# Patient Record
Sex: MALE | Race: WHITE
[De-identification: names, ages, dates, MRNs, and addresses within clinical notes are randomized per-mention and may not be internally consistent; named-entity substitution may affect disease eponyms.]

---

## 2018-12-29 ENCOUNTER — HOSPITAL ENCOUNTER (EMERGENCY)
Dept: HOSPITAL 31 - C.ER | Age: 63
Discharge: HOME | End: 2018-12-29
Payer: MEDICAID

## 2018-12-29 VITALS
SYSTOLIC BLOOD PRESSURE: 129 MMHG | HEART RATE: 92 BPM | RESPIRATION RATE: 18 BRPM | OXYGEN SATURATION: 98 % | TEMPERATURE: 98.3 F | DIASTOLIC BLOOD PRESSURE: 82 MMHG

## 2018-12-29 DIAGNOSIS — M54.5: Primary | ICD-10-CM

## 2018-12-29 NOTE — RAD
Date of service: 



12/29/2018



PROCEDURE:  Radiographs of the Lumbar Spine.



HISTORY:

back pain







COMPARISON:

No prior.



FINDINGS:



BONES:

There is diffuse bone demineralization and multilevel degenerative 

changes in the spine.



There is normal alignment of the lumbar vertebral bodies.  There is 

normal lumbar lordosis. There is no acute fracture, spondylolysis or 

spondylolisthesis.  Bone mineralization is normal.



DISC SPACES:

The disc heights are maintained.



OTHER FINDINGS:

No pathologic soft tissue calcifications.  There is a focal area of 

sclerosis along the inferior left sacroiliac joint



IMPRESSION:

No acute fracture, spondylolysis or spondylolisthesis.



Focal area of sclerosis along the inferior left sacroiliac joint is 

nonspecific and could represent a large bone island however sclerotic 

metastasis is also a considerations.  Correlation with known or 

suspected history of cancer is advised and if clinically indicated 

radionuclide scan may be performed for definitive evaluation.

## 2018-12-29 NOTE — C.PDOC
History Of Present Illness





63 year old male with no prior medical problems presents to the ED for 

evaluation of sudden onset of lower back pain s/p bending over to  an 

object 2 days ago. The patient reports bending over to  an object and 

suddenly feeling pain to his lower back. He admits to taking Motrin with no 

relief in symptoms. Denies weakness, numbness, tingling, incontinence to urine 

or stool, saddle anesthesia, and any other associated symptoms. 





Time Seen by Provider: 12/29/18 11:55


Chief Complaint (Nursing): Back Pain


History Per: Patient


History/Exam Limitations: no limitations


Onset/Duration Of Symptoms: Days


Current Symptoms Are (Timing): Still Present





Past Medical History


Reviewed: Historical Data, Nursing Documentation, Vital Signs


Vital Signs: 





                                Last Vital Signs











Temp  98.3 F   12/29/18 11:30


 


Pulse  92 H  12/29/18 11:30


 


Resp  18   12/29/18 11:30


 


BP  129/82   12/29/18 11:30


 


Pulse Ox  98   12/29/18 11:30











Family History: States: Unknown Family Hx





- Social History


Hx Tobacco Use: No


Hx Alcohol Use: No


Hx Substance Use: No





- Immunization History


Hx Tetanus Toxoid Vaccination: No


Hx Influenza Vaccination: No


Hx Pneumococcal Vaccination: No





Review Of Systems


Except As Marked, All Systems Reviewed And Found Negative.


Musculoskeletal: Positive for: Other (lower back pain. )





Physical Exam





- Physical Exam


Appears: Non-toxic, No Acute Distress


Skin: Normal Color, Warm, Dry


Head: Atraumatic, Normacephalic


Eye(s): bilateral: Normal Inspection, PERRL, EOMI


Nose: Normal


Oral Mucosa: Moist


Neck: Supple


Chest: Symmetrical


Cardiovascular: Rhythm Regular, No Murmur


Respiratory: Normal Breath Sounds, No Rales, No Rhonchi, No Wheezing


Back: No Vertebral Tenderness, Paraspinal Tenderness


Extremity: Bilateral: Atraumatic, Normal Color And Temperature, Normal ROM, 

Other ((-) straight leg test. )


Neurological/Psych: Oriented x3, Normal Speech, Normal Motor, Normal Sensation, 

Normal Reflexes





ED Course And Treatment


O2 Sat by Pulse Oximetry: 98 (RA)


Pulse Ox Interpretation: Normal





- Other Rad


  ** LS x-ray 


X-Ray: Interpreted by Me, Viewed By Me


Interpretation: preliminary reading: no fracture, no dislocation, no active 

disease.





Medical Decision Making


Medical Decision Making: 





Assessment: lower back pain 





Plan: 


-Flexeril 


-Lidoderm 


-Motrin 


-Tylenol/Codeine 


-LS AP/LAT x-ray 





Progress/Update: 


X-ray: preliminary reading no fracture, no dislocation, no active disease.





reviewed radiology reading and no report of cancer in patient history. 





Patient stable for discharge home. 





Prescribed Naproxen, Lidoderm, and Cyclobenzaprine HCl








Disposition


Counseled Patient/Family Regarding: Studies Performed, Diagnosis, Need For 

Followup, Rx Given





- Disposition


Referrals: 


[facility] [Outside]


Disposition: HOME/ ROUTINE


Disposition Time: 12:21


Condition: STABLE


Additional Instructions: 


follow up with medical clinic within 2 days


call to make an appointment


take medications as needed for pain


no heavy lifting 


return to ER if symptoms worsens or progress 


Prescriptions: 


Cyclobenzaprine [Cyclobenzaprine HCl] 10 mg PO TID PRN #12 tab


 PRN Reason: Muscle Spasm


Lidocaine 5% [Lidoderm] 1 ea TD DAILY PRN #10 patch


 PRN Reason: Pain, Moderate (4-7)


Naproxen [Naprosyn] 500 mg PO BID PRN #16 tab


 PRN Reason: Pain, Moderate (4-7)


Instructions:  Low Back Pain in Adults


Forms:  Gen Discharge Inst [place], [facility] ([place]), Work Excuse


Print Language: Dominican





- Clinical Impression


Clinical Impression: 


 Low back pain








- Scribe Statement


The provider has reviewed the documentation as recorded by the Scribe (Latonia Crawford)


Provider Attestation: 





All medical record entries made by the Scribe were at my direction and 

personally dictated by me. I have reviewed the chart and agree that the record 

accurately reflects my personal performance of the history, physical exam, 

medical decision making, and the department course for this patient. I have also

 personally directed, reviewed, and agree with the discharge instructions and 

disposition.

## 2019-03-01 ENCOUNTER — HOSPITAL ENCOUNTER (INPATIENT)
Dept: HOSPITAL 31 - C.ER | Age: 64
LOS: 2 days | Discharge: HOME | DRG: 247 | End: 2019-03-03
Attending: FAMILY MEDICINE | Admitting: FAMILY MEDICINE
Payer: SELF-PAY

## 2019-03-01 VITALS — BODY MASS INDEX: 25.8 KG/M2

## 2019-03-01 DIAGNOSIS — F17.210: ICD-10-CM

## 2019-03-01 DIAGNOSIS — R74.0: ICD-10-CM

## 2019-03-01 DIAGNOSIS — I25.10: ICD-10-CM

## 2019-03-01 DIAGNOSIS — Z83.3: ICD-10-CM

## 2019-03-01 DIAGNOSIS — I21.19: Primary | ICD-10-CM

## 2019-03-01 DIAGNOSIS — Z79.899: ICD-10-CM

## 2019-03-01 DIAGNOSIS — Z77.22: ICD-10-CM

## 2019-03-01 LAB
ALBUMIN SERPL-MCNC: 4.8 G/DL (ref 3.5–5)
ALBUMIN/GLOB SERPL: 1.4 {RATIO} (ref 1–2.1)
ALT SERPL-CCNC: 71 U/L (ref 21–72)
APTT BLD: 56 SECONDS (ref 21–34)
AST SERPL-CCNC: 98 U/L (ref 17–59)
BASOPHILS # BLD AUTO: 0 K/UL (ref 0–0.2)
BASOPHILS NFR BLD: 0.3 % (ref 0–2)
BUN SERPL-MCNC: 19 MG/DL (ref 9–20)
CALCIUM SERPL-MCNC: 9.1 MG/DL (ref 8.6–10.4)
CK MB SERPL-MCNC: 346 NG/ML (ref 0–3.38)
CK MB SERPL-MCNC: 394 NG/ML (ref 0–3.38)
EOSINOPHIL # BLD AUTO: 0 K/UL (ref 0–0.7)
EOSINOPHIL NFR BLD: 0.1 % (ref 0–4)
ERYTHROCYTE [DISTWIDTH] IN BLOOD BY AUTOMATED COUNT: 13.8 % (ref 11.5–14.5)
GFR NON-AFRICAN AMERICAN: > 60
HGB BLD-MCNC: 16.2 G/DL (ref 12–18)
INR PPP: 1
LYMPHOCYTE: 4 % (ref 20–40)
LYMPHOCYTES # BLD AUTO: 1.2 K/UL (ref 1–4.3)
LYMPHOCYTES NFR BLD AUTO: 9.9 % (ref 20–40)
MCH RBC QN AUTO: 33.6 PG (ref 27–31)
MCHC RBC AUTO-ENTMCNC: 33.1 G/DL (ref 33–37)
MCV RBC AUTO: 101.7 FL (ref 80–94)
MONOCYTE: 3 % (ref 0–10)
MONOCYTES # BLD: 0.3 K/UL (ref 0–0.8)
MONOCYTES NFR BLD: 2.8 % (ref 0–10)
NEUTROPHILS # BLD: 10.7 K/UL (ref 1.8–7)
NEUTROPHILS NFR BLD AUTO: 86.9 % (ref 50–75)
NEUTROPHILS NFR BLD AUTO: 93 % (ref 50–75)
NRBC BLD AUTO-RTO: 0.1 % (ref 0–2)
PLATELET # BLD EST: NORMAL 10*3/UL
PLATELET # BLD: 294 K/UL (ref 130–400)
PMV BLD AUTO: 8.9 FL (ref 7.2–11.7)
PROTHROMBIN TIME: 11.2 SECONDS (ref 9.7–12.2)
RBC # BLD AUTO: 4.83 MIL/UL (ref 4.4–5.9)
RBC MORPH BLD: NORMAL
TOTAL CELLS COUNTED BLD: 100
TROPONIN I SERPL-MCNC: 232 NG/ML (ref 0–0.12)
TROPONIN I SERPL-MCNC: 346 NG/ML (ref 0–0.12)
WBC # BLD AUTO: 12.3 K/UL (ref 4.8–10.8)

## 2019-03-01 PROCEDURE — B2111ZZ FLUOROSCOPY OF MULTIPLE CORONARY ARTERIES USING LOW OSMOLAR CONTRAST: ICD-10-PCS | Performed by: INTERNAL MEDICINE

## 2019-03-01 PROCEDURE — B2151ZZ FLUOROSCOPY OF LEFT HEART USING LOW OSMOLAR CONTRAST: ICD-10-PCS | Performed by: INTERNAL MEDICINE

## 2019-03-01 PROCEDURE — 4A023N7 MEASUREMENT OF CARDIAC SAMPLING AND PRESSURE, LEFT HEART, PERCUTANEOUS APPROACH: ICD-10-PCS | Performed by: INTERNAL MEDICINE

## 2019-03-01 PROCEDURE — 027034Z DILATION OF CORONARY ARTERY, ONE ARTERY WITH DRUG-ELUTING INTRALUMINAL DEVICE, PERCUTANEOUS APPROACH: ICD-10-PCS | Performed by: INTERNAL MEDICINE

## 2019-03-01 RX ADMIN — EPTIFIBATIDE SCH MLS/HR: 0.75 INJECTION, SOLUTION INTRAVENOUS at 13:42

## 2019-03-01 RX ADMIN — EPTIFIBATIDE SCH MLS/HR: 0.75 INJECTION, SOLUTION INTRAVENOUS at 20:00

## 2019-03-01 NOTE — CP.PCM.PN
<Rajeev Bellamy - Last Filed: 03/01/19 11:58>





Subjective





- Date & Time of Evaluation


Date of Evaluation: 03/01/19


Time of Evaluation: 11:00





- Subjective


Subjective: 


House Doctor note for CODE HEART 





Patient is 64 year old male with no pmhx, brought from field by EMS complaining 

of chest tightness and nonradiating chest pain 6/8 upon awakening, chills and 

nausea, started this morning at 7 am, patient did not lose consciousness,  EKG 

in field remarkable for inferior and anterior lateral ST elevation changes. 

Received ASA in the field. As per step daughter, patient does not take 

medication at home, no allergies, smokes cigarettes, does not know the amount. 

Upon Ed arrival, patient is alert and oriented, complaining of chest pain, and 

chills, Patient received heparin bolus and low dose Brilinta, transferred to 

cath lab for cath with Dr Horton. Patient and family explained of the procedure. 

consent signed by patient for cath and anesthesia, in chart.  








Objective





- Vital Signs/Intake and Output


Vital Signs (last 24 hours): 


                                        











Temp Pulse Resp BP Pulse Ox


 


    62   18   105/85   100 


 


    03/01/19 11:48  03/01/19 11:48  03/01/19 11:48  03/01/19 11:53











- Labs


Labs: 


                                        





                                 03/01/19 11:44 





                                        











PT  11.2 SECONDS (9.7-12.2)   03/01/19  11:44    


 


INR  1.0   03/01/19  11:44    


 


APTT  56 SECONDS (21-34)  H  03/01/19  11:44    














- Constitutional


Appears: Non-toxic





- Head Exam


Head Exam: ATRAUMATIC, NORMAL INSPECTION, NORMOCEPHALIC





- Eye Exam


Eye Exam: EOMI, Normal appearance





- ENT Exam


ENT Exam: Mucous Membranes Moist





- Neck Exam


Neck Exam: Normal Inspection





- Respiratory Exam


Respiratory Exam: absent: Rales, Rhonchi, Wheezes





- Cardiovascular Exam


Cardiovascular Exam: REGULAR RHYTHM, +S1, +S2





- GI/Abdominal Exam


GI & Abdominal Exam: Soft





- Neurological Exam


Neurological Exam: Alert, Awake, Oriented x3





- Psychiatric Exam


Psychiatric exam: Anxious





- Skin


Skin Exam: Dry, Intact, Normal Color, Warm





<Anat Houser - Last Filed: 03/01/19 20:52>





Objective





- Vital Signs/Intake and Output


Vital Signs (last 24 hours): 


                                        











Temp Pulse Resp BP Pulse Ox


 


 98.1 F   61   27 H  151/99 H  100 


 


 03/01/19 16:00  03/01/19 20:01  03/01/19 20:01  03/01/19 20:01  03/01/19 20:01








Intake and Output: 


                                        











 03/01/19 03/02/19





 18:59 06:59


 


Intake Total 381.9 61.7


 


Output Total 375 


 


Balance 6.9 61.7














- Medications


Medications: 


                               Current Medications





Acetaminophen (Tylenol 325mg Tab)  650 mg PO Q6 PRN


   PRN Reason: Pain, moderate (4-7)


Aspirin (Ecotrin)  81 mg PO DAILY [facility]


Dextrose (Dextrose 50% Inj)  0 ml IV STAT PRN; Protocol


   PRN Reason: Hypoglycemia Protocol


Dextrose (Glutose 15)  0 gm PO ONCE PRN; Protocol


   PRN Reason: Hypoglycemia Protocol


Enalapril Maleate (Vasotec)  2.5 mg PO DAILY [facility]


Glucagon (Glucagen Diagnostic Kit)  0 mg IM STAT PRN; Protocol


   PRN Reason: Hypoglycemia Protocol


Eptifibatide (Integrilin)  75 mg in 100 mls @ 11.612 mls/hr IV .Q8H37M [facility]


   Stop: 03/02/19 00:46


   Last Admin: 03/01/19 13:42 Dose:  11.612 mls/hr


Sodium Chloride (Sodium Chloride 0.9%)  500 mls @ 50 mls/hr IV .Q10H [facility]


   Last Admin: 03/01/19 13:45 Dose:  50 mls/hr


Dextrose (Dextrose 5% In Water 1000 Ml)  1,000 mls @ 0 mls/hr IV .Q0M PRN; 

Protocol


   PRN Reason: Hypoglycemia Protocol


Rosuvastatin Calcium (Crestor)  20 mg PO HS [facility]


Ticagrelor (Brilinta)  90 mg PO BID [facility]


   Last Admin: 03/01/19 17:23 Dose:  90 mg











- Labs


Labs: 


                                        





                                 03/01/19 11:44 





                                 03/01/19 11:44 





                                        











PT  11.2 SECONDS (9.7-12.2)   03/01/19  11:44    


 


INR  1.0   03/01/19  11:44    


 


APTT  56 SECONDS (21-34)  H  03/01/19  11:44    














Attending/Attestation





- Attestation


I have personally seen and examined this patient.: Yes


I have fully participated in the care of the patient.: Yes


I have reviewed all pertinent clinical information, including history, physical 

exam and plan: Yes


Notes (Text): 


Brief hospitalist note


Read: code heart


Patient seen at [facility].  Responded to code heart as part of code heart 

protocol.  Patient is a 64-year-old male noted to have chest pain early this 

morning at approximately 730 per EMS as per as per as well as a stepdaughter at 

bedside.  Patient given aspirin 325 en route per EMS.  Per code heart protocol 

and given loading dose of Brilinta and heparin patient accompanied to Cath Lab 

no acute events noted with the assistance of translation with my resident Dr. Bellamy in Faroese we have described the cardiac cath risks involved as well as 

sedation and risks involved in regards to patient's having had a heart attack 

requiring a cardiac catheter immediately.  I also spoke with patient's 

stepdaughter who is present at bedside as well.  Interventional cardiology 

arrived on scene.  Per management per interventional cardiology

## 2019-03-01 NOTE — RAD
Date of service: 



03/01/2019



HISTORY:

Chest pain



COMPARISON:

No prior. 



FINDINGS:



LUNGS:

The lungs are well inflated.  There is mild pulmonary venous 

congestion.  No focal consolidation. 



PLEURA:

No pleural effusions or pneumothorax. 



CARDIOVASCULAR:

The heart is normal in size.  There is unfolding of the aorta.  No 

aortic atherosclerotic calcifications present. 



OSSEOUS STRUCTURES:

Within normal limits for the patient's age.



VISUALIZED UPPER ABDOMEN:

Normal.



OTHER FINDINGS:

None.



IMPRESSION:

No active pulmonary disease.

## 2019-03-01 NOTE — CP.PCM.HP
<RituBobby - Last Filed: 19 15:51>





History of Present Illness





- History of Present Illness


History of Present Illness: 











This is a 64 year old male, originally from [place], with past medical hx of

personal tobacco abuse, also significant secondhand smoke exposure, presenting 

today with chief complaint of chest pain. Pt brought in by EMS. Patient is 

[place]oz[place] speaking only. Hx obtained through step daughter. Pt says that as 

patient was getting ready for work this morning around 7:30 am, began feeling 

some chest tightness and pressure and overall not feeling right. Reports this 

has never happened before. Reports pain was diffuse across chest with no 

radiation to jaw or arm. Pain was constant with severity of 9/10. Patient did 

not take any medications at home to alleviate pain. Initially patient was going 

to ride it out but began shaking and feeling pale, decision was made to call 

ambulance. 























PMD: None; reports he has not seen a doctor in an outpatient setting in many 

years.





PMH: Tobacco abuse, secondhand smoke exposure (wife)








PSH: Denies











Allergies: NKDA











FH: Mother- 


Father-





Reports family hx of DM. Otherwise denies hx of heart dx, cancer, etc.











Current medications: None














Social hx: Current smoker. Cutting down now but but pack per day for over 20 

yrs. Social drinker. No drug use. Born in [place]. 7 children. Lives with wife and 2 

stepdaughters. . 











Prior visits: Recent ER visit 2018 for lower back pain. No prior admissions. 








Code status: Stepdaughter reports this has not been discussed yet.








Advance directive: None








Healthcare proxy:  None. 





Present on Admission





- Present on Admission


Any Indicators Present on Admission: No


History of DVT/PE: No


History of Uncontrolled Diabetes: No


Urinary Catheter: No


Decubitus Ulcer Present: No





Review of Systems





- Constitutional


Constitutional: absent: Anorexia, Fever





- EENT


Eyes: absent: Blurred Vision, Change in Vision


Ears: absent: Decreased Hearing, Tinnitus


Nose/Mouth/Throat: absent: Nasal Congestion, Nasal Discharge





- Cardiovascular


Cardiovascular: Chest Pain, Chest Pain at Rest, Dyspnea





- Respiratory


Respiratory: Dyspnea.  absent: Cough





- Gastrointestinal


Gastrointestinal: absent: Abdominal Pain, Bloating





- Genitourinary


Genitourinary: absent: Change in Urinary Stream, Difficulty Urinating





- Musculoskeletal


Musculoskeletal: absent: Abnormal Gait, Back Pain





- Integumentary


Integumentary: absent: Rash





- Neurological


Neurological: absent: Syncope





- Psychiatric


Psychiatric: absent: Hallucinations, Visual Hallucinations, Tactile 

Hallucinations





- Endocrine


Endocrine: absent: Polyphagia, Polyuria





- Hematologic/Lymphatic


Hematologic: absent: Easy Bleeding, Easy Bruising





Past Patient History





- Infectious Disease


Hx of Infectious Diseases: None





- Tetanus Immunizations


Tetanus Immunization: Unknown





- Past Medical History & Family History


Past Medical History?: Yes


Pertinent Family History: 





DM in family 





- Past Social History


Smoking Status: Heavy Smoker > 10 Cigarettes Daily


Chewing Tobacco Use: No


Cigar Use: No


Alcohol: Social


Drugs: Denies


Home Situation {Lives}: With Family


Domestic Violence: Negative





- PSYCHIATRIC


Hx Substance Use: No





- SURGICAL HISTORY


Hx Surgeries: No





- ANESTHESIA


Hx Anesthesia: No





Meds


Allergies/Adverse Reactions: 


                                    Allergies











Allergy/AdvReac Type Severity Reaction Status Date / Time


 


No Known Allergies Allergy   Verified 19 11:37














Physical Exam





- Constitutional


Appears: Non-toxic, No Acute Distress





- Head Exam


Head Exam: ATRAUMATIC, NORMAL INSPECTION, NORMOCEPHALIC





- Eye Exam


Eye Exam: EOMI





- ENT Exam


ENT Exam: Mucous Membranes Moist





- Neck Exam


Neck exam: Positive for: Full Rom, Normal Inspection





- Respiratory Exam


Respiratory Exam: NORMAL BREATHING PATTERN.  absent: Respiratory Distress





- Cardiovascular Exam


Cardiovascular Exam: REGULAR RHYTHM, +S1, +S2





- GI/Abdominal Exam


GI & Abdominal Exam: Normal Bowel Sounds, Soft.  absent: Tenderness





- Extremities Exam


Extremities exam: Positive for: full ROM, normal inspection





- Neurological Exam


Neurological exam: Alert, CN II-XII Intact, Oriented x3





- Psychiatric Exam


Psychiatric exam: Flat Affect





- Skin


Skin Exam: Dry, Intact, Normal Color, Warm


Additional comments: 





dressing right groin clean, dry, intact





Results





- Vital Signs


Recent Vital Signs: 





                                Last Vital Signs











Temp      


 


Pulse  62   19 11:48


 


Resp  18   19 11:48


 


BP  105/85   19 11:48


 


Pulse Ox  100   19 11:53














- Labs


Result Diagrams: 


                                 19 11:44





                                 19 11:44


Labs: 





                         Laboratory Results - last 24 hr











  19





  11:44 11:44 11:44


 


WBC  12.3 H  


 


RBC  4.83  


 


Hgb  16.2  


 


Hct  49.1  


 


MCV  101.7 H  


 


MCH  33.6 H  


 


MCHC  33.1  


 


RDW  13.8  


 


Plt Count  294  


 


MPV  8.9  


 


Neut % (Auto)  86.9 H  


 


Lymph % (Auto)  9.9 L  


 


Mono % (Auto)  2.8  


 


Eos % (Auto)  0.1  


 


Baso % (Auto)  0.3  


 


Neut # (Auto)  10.7 H  


 


Lymph # (Auto)  1.2  


 


Mono # (Auto)  0.3  


 


Eos # (Auto)  0.0  


 


Baso # (Auto)  0.0  


 


Neutrophils % (Manual)  93 H  


 


Lymphocytes % (Manual)  4 L  


 


Monocytes % (Manual)  3  


 


Platelet Estimate  Normal  


 


RBC Morphology  Normal  


 


PT   11.2 


 


INR   1.0 


 


APTT   56 H 


 


Sodium    140


 


Potassium    4.6


 


Chloride    102


 


Carbon Dioxide    25


 


Anion Gap    18


 


BUN    19


 


Creatinine    0.9


 


Est GFR ( Amer)    > 60


 


Est GFR (Non-Af Amer)    > 60


 


Random Glucose    210 H


 


Calcium    9.1


 


Total Bilirubin    0.5


 


AST    98 H


 


ALT    71


 


Alkaline Phosphatase    131 H


 


Lactate Dehydrogenase    645 H


 


Total Creatine Kinase    242 H


 


Troponin I    0.4660 H*


 


Total Protein    8.2


 


Albumin    4.8


 


Globulin    3.5


 


Albumin/Globulin Ratio    1.4


 


Blood Type   


 


Antibody Screen   














  19





  11:52


 


WBC 


 


RBC 


 


Hgb 


 


Hct 


 


MCV 


 


MCH 


 


MCHC 


 


RDW 


 


Plt Count 


 


MPV 


 


Neut % (Auto) 


 


Lymph % (Auto) 


 


Mono % (Auto) 


 


Eos % (Auto) 


 


Baso % (Auto) 


 


Neut # (Auto) 


 


Lymph # (Auto) 


 


Mono # (Auto) 


 


Eos # (Auto) 


 


Baso # (Auto) 


 


Neutrophils % (Manual) 


 


Lymphocytes % (Manual) 


 


Monocytes % (Manual) 


 


Platelet Estimate 


 


RBC Morphology 


 


PT 


 


INR 


 


APTT 


 


Sodium 


 


Potassium 


 


Chloride 


 


Carbon Dioxide 


 


Anion Gap 


 


BUN 


 


Creatinine 


 


Est GFR ( Amer) 


 


Est GFR (Non-Af Amer) 


 


Random Glucose 


 


Calcium 


 


Total Bilirubin 


 


AST 


 


ALT 


 


Alkaline Phosphatase 


 


Lactate Dehydrogenase 


 


Total Creatine Kinase 


 


Troponin I 


 


Total Protein 


 


Albumin 


 


Globulin 


 


Albumin/Globulin Ratio 


 


Blood Type  O POSITIVE


 


Antibody Screen  Negative














Assessment & Plan





- Assessment and Plan (Free Text)


Assessment: 

















This is a 65 yo male, originally from [place], with past medical hx of tobacco abuse,

presenting with























1. Acute ST segment elevation MI














-ekg consistent with acute infero-lateral MI


-pt brought in by EMS


-code heart activated


-code heart on call doctor notified. Dr. Horton.


-pt consented for cardiac cath and transported to cardiac cath lab


-stent placed in RCA, full report pending


-start enalapril 2.5 mg po daily 


-start eptifibatide drip


-start rosuvasatin 20 mg po hs


-start ticagrelor 90 mg po bid 


-start asa 81 mg po daily 


-IV NS


-lipid panel


-HGB a1C

















2. Hx of tobacco abuse











-encourage smoking cessation




















3. GI/DVT ppx








-no GI indicated


-on eptifibatide drip and ticagrelor














discussed with Dr. Houser. 





<nAat Houser - Last Filed: 19 21:09>





Results





- Vital Signs


Recent Vital Signs: 





                                Last Vital Signs











Temp  98.1 F   19 16:00


 


Pulse  61   19 20:01


 


Resp  27 H  19 20:01


 


BP  151/99 H  19 20:01


 


Pulse Ox  100   19 20:01














- Labs


Result Diagrams: 


                                 19 11:44





                                 19 11:44


Labs: 





                         Laboratory Results - last 24 hr











  19





  11:44 11:44 11:44


 


WBC  12.3 H  


 


RBC  4.83  


 


Hgb  16.2  


 


Hct  49.1  


 


MCV  101.7 H  


 


MCH  33.6 H  


 


MCHC  33.1  


 


RDW  13.8  


 


Plt Count  294  


 


MPV  8.9  


 


Neut % (Auto)  86.9 H  


 


Lymph % (Auto)  9.9 L  


 


Mono % (Auto)  2.8  


 


Eos % (Auto)  0.1  


 


Baso % (Auto)  0.3  


 


Neut # (Auto)  10.7 H  


 


Lymph # (Auto)  1.2  


 


Mono # (Auto)  0.3  


 


Eos # (Auto)  0.0  


 


Baso # (Auto)  0.0  


 


Neutrophils % (Manual)  93 H  


 


Lymphocytes % (Manual)  4 L  


 


Monocytes % (Manual)  3  


 


Platelet Estimate  Normal  


 


RBC Morphology  Normal  


 


PT   11.2 


 


INR   1.0 


 


APTT   56 H 


 


Sodium    140


 


Potassium    4.6


 


Chloride    102


 


Carbon Dioxide    25


 


Anion Gap    18


 


BUN    19


 


Creatinine    0.9


 


Est GFR ( Amer)    > 60


 


Est GFR (Non-Af Amer)    > 60


 


Random Glucose    210 H


 


Calcium    9.1


 


Total Bilirubin    0.5


 


AST    98 H


 


ALT    71


 


Alkaline Phosphatase    131 H


 


Lactate Dehydrogenase    645 H


 


Total Creatine Kinase    242 H


 


CK-MB (Mass)   


 


Troponin I    0.4660 H*


 


Total Protein    8.2


 


Albumin    4.8


 


Globulin    3.5


 


Albumin/Globulin Ratio    1.4


 


Blood Type   


 


Antibody Screen   














  19





  11:52 16:09


 


WBC  


 


RBC  


 


Hgb  


 


Hct  


 


MCV  


 


MCH  


 


MCHC  


 


RDW  


 


Plt Count  


 


MPV  


 


Neut % (Auto)  


 


Lymph % (Auto)  


 


Mono % (Auto)  


 


Eos % (Auto)  


 


Baso % (Auto)  


 


Neut # (Auto)  


 


Lymph # (Auto)  


 


Mono # (Auto)  


 


Eos # (Auto)  


 


Baso # (Auto)  


 


Neutrophils % (Manual)  


 


Lymphocytes % (Manual)  


 


Monocytes % (Manual)  


 


Platelet Estimate  


 


RBC Morphology  


 


PT  


 


INR  


 


APTT  


 


Sodium  


 


Potassium  


 


Chloride  


 


Carbon Dioxide  


 


Anion Gap  


 


BUN  


 


Creatinine  


 


Est GFR ( Amer)  


 


Est GFR (Non-Af Amer)  


 


Random Glucose  


 


Calcium  


 


Total Bilirubin  


 


AST  


 


ALT  


 


Alkaline Phosphatase  


 


Lactate Dehydrogenase  


 


Total Creatine Kinase   7771 H


 


CK-MB (Mass)   346 H


 


Troponin I   232.0000 H*


 


Total Protein  


 


Albumin  


 


Globulin  


 


Albumin/Globulin Ratio  


 


Blood Type  O POSITIVE 


 


Antibody Screen  Negative 














Attending/Attestation





- Attestation


I have personally seen and examined this patient.: Yes


I have fully participated in the care of the patient.: Yes


I have reviewed all pertinent clinical information: Yes


Notes (Text): 


Patient seen, examined, case discussed with medical resident.


Patient patient presented a code heart.  Patient seen and case discussed with 

both interventional cardiologist and intensive care.  Patient is a 64-year-old 

male with prominent tobacco history who came in for chest pain initially at 7:30

AM this morning did not get better prompted calling by family by EMS this is the

first time he has had chest pain he is not seen PMD in quite some time no recent

trauma was not feeling unwell days prior was going about his normal business 

awaiting above I am except for this event.  He does not take any medications per

family.  Patient was accompanied to Cath Lab.  I described risks and benefits of

Cath Lab procedure as well as sedation with the help of translation with my 

[place]-speaking resident Dr. Bellamy.  But despite nurse Ruben in the emergency

room.  Patient consented to both catheter and sedation if needed.  Patient 

stabilized from cath following suit management per interventional cardiology.  

Patient was transferred to the intensive care following Cath Lab procedure.  

Patient on EKG at time of code heart was anterior inferior lateral MI.  Noted 

for RCA involvement.





1.  Acute coronary syndrome


Assessment/plan


* Code heart on 2019.  


* Management per interventional cardiology.  


* Patient stabilized and taken to the intensive care following a Cath Lab 

  procedure


* Patient to be observed in the ICU


* Echo ordered lipid panel


* TSH, A1c ordered for tomorrow.  


* Cardiology on board.  


* Patient to be observed in the intensive care unit.  


* Patient is currently on integrilin drip 


* Aspirin 81mg PO daily


* Brilinta 90mg PO BID


* Crestor 20mg PO qHS


* d/c Lisinopril to Losartan 25mg Po daily (given increase cancer risk with 

  lisinopril)


* likely need beta blocker assess tomorrow





2.  Tobacco abuse


Assessment/plan


* Nicotine patch daily


* patient will need extensive counseling regards to tobacco abuse and associated

  adverse events including but not limited to premature aging cancer risk and 

  ultimately death.





3. Prophylactic measure


Assessment/plan


* Integrilin drip per cardio.


* Pepcid 20 mg p.o. once a day


* IV fluids

## 2019-03-01 NOTE — CP.PCM.CON
History of Present Illness





- History of Present Illness


History of Present Illness: 





Patient with Acute Infero-lateral Myocardial infarction.





Past Patient History





- Infectious Disease


Hx of Infectious Diseases: None





- Past Social History


Smoking Status: Heavy Smoker > 10 Cigarettes Daily





- PSYCHIATRIC


Hx Substance Use: No





- SURGICAL HISTORY


Hx Surgeries: No





- ANESTHESIA


Hx Anesthesia: No





Meds


Home Medications: 


                              Home Medication List











 Medication  Instructions  Recorded  Confirmed  Type


 


Aspirin [Ecotrin] 81 mg PO DAILY #30 tabec 03/03/19  Rx


 


Clopidogrel Bisulfate [Plavix] 75 mg PO DAILY #30 tablet 03/03/19  Rx


 


Losartan [Cozaar] 25 mg PO DAILY #30 tab 03/03/19  Rx


 


Metoprolol Succinate XL [Toprol XL] 12.5 mg PO DAILY #30 tab 03/03/19  Rx


 


Rosuvastatin Calcium [Crestor] 20 mg PO HS #30 tab 03/03/19  Rx











Allergies/Adverse Reactions: 


                                    Allergies











Allergy/AdvReac Type Severity Reaction Status Date / Time


 


No Known Allergies Allergy   Verified 03/01/19 11:37














Results





- Vital Signs


Recent Vital Signs: 


                                Last Vital Signs











Temp      


 


Pulse  62   03/01/19 11:48


 


Resp  18   03/01/19 11:48


 


BP  105/85   03/01/19 11:48


 


Pulse Ox  100   03/01/19 11:53














- Labs


Result Diagrams: 


                                 03/03/19 09:05





                                 03/03/19 09:05


Labs: 


                         Laboratory Results - last 24 hr











  03/01/19 03/01/19 03/01/19





  11:44 11:44 11:44


 


WBC  12.3 H  


 


RBC  4.83  


 


Hgb  16.2  


 


Hct  49.1  


 


MCV  101.7 H  


 


MCH  33.6 H  


 


MCHC  33.1  


 


RDW  13.8  


 


Plt Count  294  


 


MPV  8.9  


 


Neut % (Auto)  86.9 H  


 


Lymph % (Auto)  9.9 L  


 


Mono % (Auto)  2.8  


 


Eos % (Auto)  0.1  


 


Baso % (Auto)  0.3  


 


Neut # (Auto)  10.7 H  


 


Lymph # (Auto)  1.2  


 


Mono # (Auto)  0.3  


 


Eos # (Auto)  0.0  


 


Baso # (Auto)  0.0  


 


Neutrophils % (Manual)  93 H  


 


Lymphocytes % (Manual)  4 L  


 


Monocytes % (Manual)  3  


 


Platelet Estimate  Normal  


 


RBC Morphology  Normal  


 


PT   11.2 


 


INR   1.0 


 


APTT   56 H 


 


Sodium    140


 


Potassium    4.6


 


Chloride    102


 


Carbon Dioxide    25


 


Anion Gap    18


 


BUN    19


 


Creatinine    0.9


 


Est GFR ( Amer)    > 60


 


Est GFR (Non-Af Amer)    > 60


 


Random Glucose    210 H


 


Calcium    9.1


 


Total Bilirubin    0.5


 


AST    98 H


 


ALT    71


 


Alkaline Phosphatase    131 H


 


Lactate Dehydrogenase    645 H


 


Total Creatine Kinase    242 H


 


Total Protein    8.2


 


Albumin    4.8


 


Globulin    3.5


 


Albumin/Globulin Ratio    1.4


 


Blood Type   


 


Antibody Screen   














  03/01/19





  11:52


 


WBC 


 


RBC 


 


Hgb 


 


Hct 


 


MCV 


 


MCH 


 


MCHC 


 


RDW 


 


Plt Count 


 


MPV 


 


Neut % (Auto) 


 


Lymph % (Auto) 


 


Mono % (Auto) 


 


Eos % (Auto) 


 


Baso % (Auto) 


 


Neut # (Auto) 


 


Lymph # (Auto) 


 


Mono # (Auto) 


 


Eos # (Auto) 


 


Baso # (Auto) 


 


Neutrophils % (Manual) 


 


Lymphocytes % (Manual) 


 


Monocytes % (Manual) 


 


Platelet Estimate 


 


RBC Morphology 


 


PT 


 


INR 


 


APTT 


 


Sodium 


 


Potassium 


 


Chloride 


 


Carbon Dioxide 


 


Anion Gap 


 


BUN 


 


Creatinine 


 


Est GFR ( Amer) 


 


Est GFR (Non-Af Amer) 


 


Random Glucose 


 


Calcium 


 


Total Bilirubin 


 


AST 


 


ALT 


 


Alkaline Phosphatase 


 


Lactate Dehydrogenase 


 


Total Creatine Kinase 


 


Total Protein 


 


Albumin 


 


Globulin 


 


Albumin/Globulin Ratio 


 


Blood Type  O POSITIVE


 


Antibody Screen  Negative














Assessment & Plan


(1) STEMI (ST elevation myocardial infarction)


Assessment and Plan: 


DAPT compliance and risk factor modification.


Status: Acute

## 2019-03-01 NOTE — C.PDOC
History Of Present Illness


66 y/o male brought in by EMS for Code Heart, initiated at 11:12 am prior to EMS

arrival. Cath lab activated. Dr. Horton notified. Patient received 325 mg Aspirin

en route.





On arrival patient reports having chest pain since 7:00am described as pressure-

like. Associated with SOB. He denies any nausea, vomiting, diaphoresis, abdomina

l pain, dizziness, headache, or other complaints. Patient admits to smoking. No 

known medical history. 





Time Seen by Provider: 03/01/19 11:35


Chief Complaint (Nursing): Chest Pain


History Per: Patient


History/Exam Limitations: no limitations


Onset/Duration Of Symptoms: Hrs


Current Symptoms Are (Timing): Still Present


Quality: Pressure


Associated Symptoms: Dyspnea


Modifying Factors: None


Additional History Per: EMS





Past Medical History


Reviewed: Historical Data, Nursing Documentation, Vital Signs


Vital Signs: 





                                Last Vital Signs











Temp      


 


Pulse  77   03/01/19 11:35


 


Resp  24   03/01/19 11:35


 


BP  172/118 H  03/01/19 11:35


 


Pulse Ox  100   03/01/19 11:35














- Medical History


PMH: No Chronic Diseases


Family History: States: Unknown Family Hx





- Social History


Hx Tobacco Use: Yes


Hx Alcohol Use: No


Hx Substance Use: No





- Immunization History


Hx Tetanus Toxoid Vaccination: No


Hx Influenza Vaccination: No


Hx Pneumococcal Vaccination: No





Review Of Systems


Except As Marked, All Systems Reviewed And Found Negative.


Constitutional: Negative for: Fever, Sweats


Eyes: Negative for: Vision Change


Cardiovascular: Positive for: Chest Pain


Respiratory: Positive for: Shortness of Breath.  Negative for: Cough


Gastrointestinal: Negative for: Nausea, Vomiting, Abdominal Pain


Musculoskeletal: Negative for: Back Pain


Neurological: Negative for: Headache, Dizziness





Physical Exam





- Physical Exam


Appears: Non-toxic, No Acute Distress


Skin: Warm, Dry, No Diaphoretic


Head: Atraumatic, Normacephalic


Eye(s): bilateral: Normal Inspection, PERRL, EOMI


Neck: Normal ROM


Chest: Symmetrical, No Deformity, No Tenderness


Cardiovascular: Rhythm Regular, No Murmur


Respiratory: Normal Breath Sounds, No Rales, No Rhonchi, No Wheezing


Gastrointestinal/Abdominal: Soft, No Tenderness, No Distention


Back: Normal Inspection


Extremity: Bilateral: Atraumatic, Normal Color And Temperature


Pulses: Left Radial: Normal, Right Radial: Normal


Neurological/Psych: Oriented x3





ED Course And Treatment





- Laboratory Results


Result Diagrams: 


                                 03/01/19 11:44





O2 Sat by Pulse Oximetry: 100 (RA)


Pulse Ox Interpretation: Normal





Medical Decision Making


Medical Decision Making: 





Impression: Acute MI


EKG: NSR with normal intervals, normal axis, with ST elevations in leads 2, 3, 

avF, v4-6





Administered Brilenta. 





Disposition


Discussed With Dr.: Anat Houser


Doctor Will See Patient In The: ED


Counseled Patient/Family Regarding: Studies Performed, Diagnosis





- Disposition


Disposition: HOSPITALIZED


Disposition Time: 11:53


Condition: FAIR


Forms:  [facility] (English)





- Clinical Impression


Clinical Impression: 


 STEMI (ST elevation myocardial infarction)








- Scribe Statement


The provider has reviewed the documentation as recorded by the Nenita Caputo





Provider Attestation: 


All medical record entries made by the Nancyibailyn were at my direction and 

personally dictated by me. I have reviewed the chart and agree that the record 

accurately reflects my personal performance of the history, physical exam, 

medical decision making, and the department course for this patient. I have also

personally directed, reviewed, and agree with the discharge instructions and 

disposition.

## 2019-03-01 NOTE — CP.PCM.CON
<Edward Moncada M - Last Filed: 03/01/19 14:29>





History of Present Illness





- History of Present Illness


History of Present Illness: 





Critical care consult Note for Dr. SIMONA Gonzalez





Consult for Post PCI w/ stent placement





64 M w/ PMHx of tobacco use disorder presented to the ED w/ chest pain. Per marjorie west, patient began having non radiating chest pain w/  diaphoresis and nausea 

this AM. Patient was lying in bed when symptoms arose. Patient took no 

medication at home for relief of symptoms. Patient was brought in by EMS found 

to have ST elevations in inferior leads, given aspirin and [facility] was 

called. Patient is post PCI treatment w/ stent in RCA.





At time of examination, patient offers no complaints. Denies headaches, vision 

changes, chest pain, SOB, abdominal pain, nausea, vomiting, diarrhea, 

constipation. 





PMhx: none


PSHx: none


Meds: none


Allergies: none


SHx: extensive tobacco use, drinks socially, denies illicit drug use 





Review of Systems





- Constitutional


Constitutional: absent: Anorexia, Chills, Weight Loss





- Cardiovascular


Cardiovascular: absent: Chest Pain, Chest Pain at Rest





- Respiratory


Respiratory: absent: Dyspnea, Wheezing





- Gastrointestinal


Gastrointestinal: absent: Abdominal Pain





- Genitourinary


Genitourinary: absent: Hematuria, Pyuria





- Musculoskeletal


Musculoskeletal: absent: Arthralgias, Atrophy





- Integumentary


Integumentary: absent: Pruritus, Rash





- Neurological


Neurological: absent: Headaches, Syncope





- Psychiatric


Psychiatric: absent: Confusion





Past Patient History





- Infectious Disease


Hx of Infectious Diseases: None





- Tetanus Immunizations


Tetanus Immunization: Unknown





- Past Medical History & Family History


Past Medical History?: Yes





- Past Social History


Smoking Status: Heavy Smoker > 10 Cigarettes Daily


Chewing Tobacco Use: No


Cigar Use: No





- PSYCHIATRIC


Hx Substance Use: No





- SURGICAL HISTORY


Hx Surgeries: No





- ANESTHESIA


Hx Anesthesia: No





Meds


Allergies/Adverse Reactions: 


                                    Allergies











Allergy/AdvReac Type Severity Reaction Status Date / Time


 


No Known Allergies Allergy   Verified 03/01/19 11:37














- Medications


Medications: 


                               Current Medications





Enalapril Maleate (Vasotec)  2.5 mg PO DAILY [facility]


Eptifibatide (Integrilin)  75 mg in 100 mls @ 11.612 mls/hr IV .Q8H37M [facility]


   Stop: 03/02/19 00:46


Sodium Chloride (Sodium Chloride 0.9%)  500 mls @ 50 mls/hr IV .Q10H [facility]


Rosuvastatin Calcium (Crestor)  20 mg PO HS [facility]


Ticagrelor (Brilinta)  90 mg PO BID [facility]











Physical Exam





- Constitutional


Appears: Non-toxic, No Acute Distress





- Head Exam


Head Exam: NORMAL INSPECTION





- Eye Exam


Eye Exam: EOMI, Normal appearance





- ENT Exam


ENT Exam: Mucous Membranes Moist





- Respiratory Exam


Respiratory Exam: Clear to Auscultation Bilateral, NORMAL BREATHING PATTERN.  

absent: Rales, Rhonchi, Wheezes





- Cardiovascular Exam


Cardiovascular Exam: +S1, +S2





- GI/Abdominal Exam


GI & Abdominal Exam: Normal Bowel Sounds, Soft





- Extremities Exam


Extremities exam: Positive for: full ROM, normal inspection.  Negative for: calf

tenderness, pedal edema





- Back Exam


Back exam: absent: CVA tenderness (L), CVA tenderness (R)





- Neurological Exam


Neurological exam: Alert, Oriented x3





- Psychiatric Exam


Psychiatric exam: Normal Affect, Normal Mood





- Skin


Skin Exam: Dry, Intact, Normal Color, Warm





Results





- Vital Signs


Recent Vital Signs: 


                                Last Vital Signs











Temp      


 


Pulse  62   03/01/19 11:48


 


Resp  18   03/01/19 11:48


 


BP  105/85   03/01/19 11:48


 


Pulse Ox  100   03/01/19 11:53














- Labs


Result Diagrams: 


                                 03/01/19 11:44





                                 03/01/19 11:44


Labs: 


                         Laboratory Results - last 24 hr











  03/01/19 03/01/19 03/01/19





  11:44 11:44 11:44


 


WBC  12.3 H  


 


RBC  4.83  


 


Hgb  16.2  


 


Hct  49.1  


 


MCV  101.7 H  


 


MCH  33.6 H  


 


MCHC  33.1  


 


RDW  13.8  


 


Plt Count  294  


 


MPV  8.9  


 


Neut % (Auto)  86.9 H  


 


Lymph % (Auto)  9.9 L  


 


Mono % (Auto)  2.8  


 


Eos % (Auto)  0.1  


 


Baso % (Auto)  0.3  


 


Neut # (Auto)  10.7 H  


 


Lymph # (Auto)  1.2  


 


Mono # (Auto)  0.3  


 


Eos # (Auto)  0.0  


 


Baso # (Auto)  0.0  


 


Neutrophils % (Manual)  93 H  


 


Lymphocytes % (Manual)  4 L  


 


Monocytes % (Manual)  3  


 


Platelet Estimate  Normal  


 


RBC Morphology  Normal  


 


PT   11.2 


 


INR   1.0 


 


APTT   56 H 


 


Sodium    140


 


Potassium    4.6


 


Chloride    102


 


Carbon Dioxide    25


 


Anion Gap    18


 


BUN    19


 


Creatinine    0.9


 


Est GFR ( Amer)    > 60


 


Est GFR (Non-Af Amer)    > 60


 


Random Glucose    210 H


 


Calcium    9.1


 


Total Bilirubin    0.5


 


AST    98 H


 


ALT    71


 


Alkaline Phosphatase    131 H


 


Lactate Dehydrogenase    645 H


 


Total Creatine Kinase    242 H


 


Troponin I    0.4660 H*


 


Total Protein    8.2


 


Albumin    4.8


 


Globulin    3.5


 


Albumin/Globulin Ratio    1.4


 


Blood Type   


 


Antibody Screen   














  03/01/19





  11:52


 


WBC 


 


RBC 


 


Hgb 


 


Hct 


 


MCV 


 


MCH 


 


MCHC 


 


RDW 


 


Plt Count 


 


MPV 


 


Neut % (Auto) 


 


Lymph % (Auto) 


 


Mono % (Auto) 


 


Eos % (Auto) 


 


Baso % (Auto) 


 


Neut # (Auto) 


 


Lymph # (Auto) 


 


Mono # (Auto) 


 


Eos # (Auto) 


 


Baso # (Auto) 


 


Neutrophils % (Manual) 


 


Lymphocytes % (Manual) 


 


Monocytes % (Manual) 


 


Platelet Estimate 


 


RBC Morphology 


 


PT 


 


INR 


 


APTT 


 


Sodium 


 


Potassium 


 


Chloride 


 


Carbon Dioxide 


 


Anion Gap 


 


BUN 


 


Creatinine 


 


Est GFR ( Amer) 


 


Est GFR (Non-Af Amer) 


 


Random Glucose 


 


Calcium 


 


Total Bilirubin 


 


AST 


 


ALT 


 


Alkaline Phosphatase 


 


Lactate Dehydrogenase 


 


Total Creatine Kinase 


 


Troponin I 


 


Total Protein 


 


Albumin 


 


Globulin 


 


Albumin/Globulin Ratio 


 


Blood Type  O POSITIVE


 


Antibody Screen  Negative














Assessment & Plan





- Assessment and Plan (Free Text)


Assessment: 





64M w/ no PMhx, s/p PCI w/ RCA stent


Plan: 





Neuro


- A&O x3





Cardio


- s/p PCI w/ drug eluding stent 


- loaded w/ aspirin/ brilinta


- on integrellin drip


- ASA/Brilinta 


- NS 50 cc/hr


- F/u HgA1c, lipid panel, TSH, Free T4


- F/u echo


- F/u EKG





Resp


- vital stable


- continue to monitor 





GI


- CLD for dinner





Renal


- Bun/Cr - WNL


- enalapril 2.5 mg PO daily resume 3/2





Heme


- H/H wnl


- elevated WBC


- likely reactive


- continue to monitor 





PPx


- GI: no indication


- DVT: on integrillin drip





<Lucrecia Gonzalez - Last Filed: 03/01/19 15:03>





Meds





- Medications


Medications: 


                               Current Medications





Aspirin (Ecotrin)  81 mg PO DAILY [facility]


Enalapril Maleate (Vasotec)  2.5 mg PO DAILY [facility]


Eptifibatide (Integrilin)  75 mg in 100 mls @ 11.612 mls/hr IV .Q8H37M [facility]


   Stop: 03/02/19 00:46


   Last Admin: 03/01/19 13:42 Dose:  11.612 mls/hr


Sodium Chloride (Sodium Chloride 0.9%)  500 mls @ 50 mls/hr IV .Q10H [facility]


   Last Admin: 03/01/19 13:45 Dose:  50 mls/hr


Rosuvastatin Calcium (Crestor)  20 mg PO HS [facility]


Ticagrelor (Brilinta)  90 mg PO BID [facility]











Results





- Vital Signs


Recent Vital Signs: 


                                Last Vital Signs











Temp  98 F   03/01/19 13:15


 


Pulse  56 L  03/01/19 13:45


 


Resp  18   03/01/19 13:45


 


BP  139/88   03/01/19 13:45


 


Pulse Ox  100   03/01/19 11:53














- Labs


Result Diagrams: 


                                 03/01/19 11:44





                                 03/01/19 11:44


Labs: 


                         Laboratory Results - last 24 hr











  03/01/19 03/01/19 03/01/19





  11:44 11:44 11:44


 


WBC  12.3 H  


 


RBC  4.83  


 


Hgb  16.2  


 


Hct  49.1  


 


MCV  101.7 H  


 


MCH  33.6 H  


 


MCHC  33.1  


 


RDW  13.8  


 


Plt Count  294  


 


MPV  8.9  


 


Neut % (Auto)  86.9 H  


 


Lymph % (Auto)  9.9 L  


 


Mono % (Auto)  2.8  


 


Eos % (Auto)  0.1  


 


Baso % (Auto)  0.3  


 


Neut # (Auto)  10.7 H  


 


Lymph # (Auto)  1.2  


 


Mono # (Auto)  0.3  


 


Eos # (Auto)  0.0  


 


Baso # (Auto)  0.0  


 


Neutrophils % (Manual)  93 H  


 


Lymphocytes % (Manual)  4 L  


 


Monocytes % (Manual)  3  


 


Platelet Estimate  Normal  


 


RBC Morphology  Normal  


 


PT   11.2 


 


INR   1.0 


 


APTT   56 H 


 


Sodium    140


 


Potassium    4.6


 


Chloride    102


 


Carbon Dioxide    25


 


Anion Gap    18


 


BUN    19


 


Creatinine    0.9


 


Est GFR ( Amer)    > 60


 


Est GFR (Non-Af Amer)    > 60


 


Random Glucose    210 H


 


Calcium    9.1


 


Total Bilirubin    0.5


 


AST    98 H


 


ALT    71


 


Alkaline Phosphatase    131 H


 


Lactate Dehydrogenase    645 H


 


Total Creatine Kinase    242 H


 


Troponin I    0.4660 H*


 


Total Protein    8.2


 


Albumin    4.8


 


Globulin    3.5


 


Albumin/Globulin Ratio    1.4


 


Blood Type   


 


Antibody Screen   














  03/01/19





  11:52


 


WBC 


 


RBC 


 


Hgb 


 


Hct 


 


MCV 


 


MCH 


 


MCHC 


 


RDW 


 


Plt Count 


 


MPV 


 


Neut % (Auto) 


 


Lymph % (Auto) 


 


Mono % (Auto) 


 


Eos % (Auto) 


 


Baso % (Auto) 


 


Neut # (Auto) 


 


Lymph # (Auto) 


 


Mono # (Auto) 


 


Eos # (Auto) 


 


Baso # (Auto) 


 


Neutrophils % (Manual) 


 


Lymphocytes % (Manual) 


 


Monocytes % (Manual) 


 


Platelet Estimate 


 


RBC Morphology 


 


PT 


 


INR 


 


APTT 


 


Sodium 


 


Potassium 


 


Chloride 


 


Carbon Dioxide 


 


Anion Gap 


 


BUN 


 


Creatinine 


 


Est GFR ( Amer) 


 


Est GFR (Non-Af Amer) 


 


Random Glucose 


 


Calcium 


 


Total Bilirubin 


 


AST 


 


ALT 


 


Alkaline Phosphatase 


 


Lactate Dehydrogenase 


 


Total Creatine Kinase 


 


Troponin I 


 


Total Protein 


 


Albumin 


 


Globulin 


 


Albumin/Globulin Ratio 


 


Blood Type  O POSITIVE


 


Antibody Screen  Negative














Assessment & Plan





- Assessment and Plan (Free Text)


Plan: 


Post PCI, Patient remains chest pain free


-continue oral medciations as per cardiology


-repeat EKG post PCI


-obtain echo


-continue to monitor








- Date & Time


Date: 03/01/19


Time: 15:03

## 2019-03-02 VITALS — OXYGEN SATURATION: 96 %

## 2019-03-02 VITALS — RESPIRATION RATE: 20 BRPM

## 2019-03-02 LAB
ALBUMIN SERPL-MCNC: 4 G/DL (ref 3.5–5)
ALBUMIN/GLOB SERPL: 1.3 {RATIO} (ref 1–2.1)
ALT SERPL-CCNC: 164 U/L (ref 21–72)
AST SERPL-CCNC: 716 U/L (ref 17–59)
BASOPHILS # BLD AUTO: 0 K/UL (ref 0–0.2)
BASOPHILS NFR BLD: 0.1 % (ref 0–2)
BUN SERPL-MCNC: 19 MG/DL (ref 9–20)
CALCIUM SERPL-MCNC: 8.7 MG/DL (ref 8.6–10.4)
CK MB SERPL-MCNC: 213 NG/ML (ref 0–3.38)
EOSINOPHIL # BLD AUTO: 0 K/UL (ref 0–0.7)
EOSINOPHIL NFR BLD: 0.1 % (ref 0–4)
ERYTHROCYTE [DISTWIDTH] IN BLOOD BY AUTOMATED COUNT: 13.8 % (ref 11.5–14.5)
GFR NON-AFRICAN AMERICAN: > 60
HDLC SERPL-MCNC: 38 MG/DL (ref 30–70)
HEPATITIS A IGM: NEGATIVE
HEPATITIS B CORE AB: NEGATIVE
HEPATITIS C ANTIBODY: NEGATIVE
HGB BLD-MCNC: 14.4 G/DL (ref 12–18)
LDLC SERPL-MCNC: 111 MG/DL (ref 0–129)
LYMPHOCYTES # BLD AUTO: 1.8 K/UL (ref 1–4.3)
LYMPHOCYTES NFR BLD AUTO: 12 % (ref 20–40)
MCH RBC QN AUTO: 33.8 PG (ref 27–31)
MCHC RBC AUTO-ENTMCNC: 33.3 G/DL (ref 33–37)
MCV RBC AUTO: 101.4 FL (ref 80–94)
MONOCYTES # BLD: 1.6 K/UL (ref 0–0.8)
MONOCYTES NFR BLD: 10.6 % (ref 0–10)
NEUTROPHILS # BLD: 11.8 K/UL (ref 1.8–7)
NEUTROPHILS NFR BLD AUTO: 77.2 % (ref 50–75)
NRBC BLD AUTO-RTO: 0 % (ref 0–2)
PLATELET # BLD: 243 K/UL (ref 130–400)
PMV BLD AUTO: 9.9 FL (ref 7.2–11.7)
RBC # BLD AUTO: 4.26 MIL/UL (ref 4.4–5.9)
TROPONIN I SERPL-MCNC: 164 NG/ML (ref 0–0.12)
WBC # BLD AUTO: 15.3 K/UL (ref 4.8–10.8)

## 2019-03-02 RX ADMIN — METOPROLOL SUCCINATE SCH MG: 25 TABLET, EXTENDED RELEASE ORAL at 11:00

## 2019-03-02 NOTE — CP.PCM.PN
Subjective





- Date & Time of Evaluation


Date of Evaluation: 03/02/19


Time of Evaluation: 06:55





- Subjective


Subjective: 


Medical attending note


Patient seen and examined.  Patient company with his wife figure at bedside.  

Patient denies any chest pain this morning.  Patient denies headache denies 

constipation denies abdominal pain denies nausea is urinating well.  Continue 

Gralise discontinued overnight.  Patient given losartan because of high blood 

pressure.  Did  patient extensively at bedside in regards to smoking is a

risk for heart disease.  Awaiting labs to discuss further cardiac risk.  

Discussed with ICU patient is likely to be downgraded later today.





Objective





- Vital Signs/Intake and Output


Vital Signs (last 24 hours): 


                                        











Temp Pulse Resp BP Pulse Ox


 


 98.1 F   61   20   134/91 H  100 


 


 03/02/19 00:00  03/02/19 06:02  03/02/19 06:02  03/02/19 06:02  03/02/19 06:02








Intake and Output: 


                                        











 03/02/19 03/02/19





 06:59 18:59


 


Intake Total 370.2 


 


Output Total 400 


 


Balance -29.8 














- Medications


Medications: 


                               Current Medications





Acetaminophen (Tylenol 325mg Tab)  650 mg PO Q6 PRN


   PRN Reason: Pain, moderate (4-7)


Aspirin (Ecotrin)  81 mg PO DAILY [facility]


Dextrose (Dextrose 50% Inj)  0 ml IV STAT PRN; Protocol


   PRN Reason: Hypoglycemia Protocol


Dextrose (Glutose 15)  0 gm PO ONCE PRN; Protocol


   PRN Reason: Hypoglycemia Protocol


Glucagon (Glucagen Diagnostic Kit)  0 mg IM STAT PRN; Protocol


   PRN Reason: Hypoglycemia Protocol


Dextrose (Dextrose 5% In Water 1000 Ml)  1,000 mls @ 0 mls/hr IV .Q0M PRN; 

Protocol


   PRN Reason: Hypoglycemia Protocol


Losartan Potassium (Cozaar)  25 mg PO DAILY [facility]


   Last Admin: 03/01/19 22:49 Dose:  25 mg


Nicotine (Nicoderm Cq)  1 patch TD DAILY [facility]


Rosuvastatin Calcium (Crestor)  20 mg PO HS [facility]


   Last Admin: 03/01/19 21:25 Dose:  20 mg


Ticagrelor (Brilinta)  90 mg PO BID [facility]


   Last Admin: 03/01/19 17:23 Dose:  90 mg











- Labs


Labs: 


                                        





                                 03/02/19 05:50 





                                 03/02/19 05:50 





                                        











PT  11.2 SECONDS (9.7-12.2)   03/01/19  11:44    


 


INR  1.0   03/01/19  11:44    


 


APTT  56 SECONDS (21-34)  H  03/01/19  11:44    














- Constitutional


Appears: Non-toxic, No Acute Distress





- Head Exam


Head Exam: NORMAL INSPECTION





- Eye Exam


Eye Exam: EOMI





- ENT Exam


ENT Exam: Mucous Membranes Moist





- Respiratory Exam


Respiratory Exam: Clear to Ausculation Bilateral, NORMAL BREATHING PATTERN.  

absent: Rales, Rhonchi, Wheezes





- Cardiovascular Exam


Cardiovascular Exam: REGULAR RHYTHM, +S1, +S2





- GI/Abdominal Exam


GI & Abdominal Exam: Soft, Normal Bowel Sounds.  absent: Distended, Guarding, 

Rigid, Tenderness, Rebound





- Extremities Exam


Extremities Exam: absent: Pedal Edema, Tenderness





- Neurological Exam


Neurological Exam: Alert, Awake, Oriented x3





- Psychiatric Exam


Psychiatric exam: Normal Affect, Normal Mood





- Skin


Skin Exam: Dry, Intact, Normal Color, Warm





Assessment and Plan


(1) STEMI (ST elevation myocardial infarction)


Assessment & Plan: 


Cardiology on consult help appreciated


Echocardiogram pending


Aspirin 81 mg 1 tab p.o. daily


Brilinta 90 mg once a day once twice a day will need to discuss with cardiology 

if appropriate to switch to Plavix given patient does not have insurance


Crestor on hold given transaminitis


Losartan 25 once a day


Start Toprol-XL 12.5 mg once a day


Lipid panel reviewed noted below 80 HDL


TSH is low but free T4 is normal


Pending official cardiac cath report however patient did receive a drug-eluting 

stent to the RCA.


A1c pending


Status: Acute   





(2) Tobacco abuse


Assessment & Plan: 


Patient counseled extensively at bedside in regards to smoking is a risk factor 

for heart disease he is aware he should stop smoking


Nicotine patch daily


Status: Acute   





(3) Transaminitis


Assessment & Plan: 


Patient had an acute rise in LFTs


Discontinue Tylenol and


Held Crestor


Hepatitis panel we will continue to trend LFTs unclear which relieved as an 

acute phase reactant or hyperperfusion delivered in light of a heart attack


Status: Acute   





(4) Prophylactic measure


Assessment & Plan: 


DVT prophylaxis heparin 5000 subcu 8


Heart healthy diet


Smoking cessation provided





Disposition:  discussed with ICU patient downgraded by ICU to telemetry.  

Pending official read of echocardiogram will need to monitor LFTs will need to 

follow-up with cardiology in regards to switch to Plavix.


Status: Acute

## 2019-03-02 NOTE — CP.PCM.PN
Subjective





- Date & Time of Evaluation


Date of Evaluation: 03/02/19


Time of Evaluation: 08:39





- Subjective


Subjective: 


Patient seen and examined at bedside. Patient deneis any chest pain, denies any 

dizziness.





Objective





- Vital Signs/Intake and Output


Vital Signs (last 24 hours): 


                                        











Temp Pulse Resp BP Pulse Ox


 


 98.1 F   61   20   134/91 H  100 


 


 03/02/19 00:00  03/02/19 06:02  03/02/19 06:02  03/02/19 06:02  03/02/19 06:02








Intake and Output: 


                                        











 03/02/19 03/02/19





 06:59 18:59


 


Intake Total 370.2 


 


Output Total 400 


 


Balance -29.8 














- Medications


Medications: 


                               Current Medications





Aspirin (Ecotrin)  81 mg PO DAILY [facility]


Dextrose (Dextrose 50% Inj)  0 ml IV STAT PRN; Protocol


   PRN Reason: Hypoglycemia Protocol


Dextrose (Glutose 15)  0 gm PO ONCE PRN; Protocol


   PRN Reason: Hypoglycemia Protocol


Glucagon (Glucagen Diagnostic Kit)  0 mg IM STAT PRN; Protocol


   PRN Reason: Hypoglycemia Protocol


Heparin Sodium (Porcine) (Heparin)  5,000 units SC Q8 [facility]


Dextrose (Dextrose 5% In Water 1000 Ml)  1,000 mls @ 0 mls/hr IV .Q0M PRN; 

Protocol


   PRN Reason: Hypoglycemia Protocol


Losartan Potassium (Cozaar)  25 mg PO DAILY [facility]


   Last Admin: 03/01/19 22:49 Dose:  25 mg


Metoprolol Succinate (Toprol Xl)  12.5 mg PO DAILY [facility]


Nicotine (Nicoderm Cq)  1 patch TD DAILY [facility]


Rosuvastatin Calcium (Crestor)  20 mg PO HS [facility]


   Last Admin: 03/01/19 21:25 Dose:  20 mg


Ticagrelor (Brilinta)  90 mg PO BID [facility]


   Last Admin: 03/01/19 17:23 Dose:  90 mg











- Labs


Labs: 


                                        





                                 03/02/19 05:50 





                                 03/02/19 05:50 





                                        











PT  11.2 SECONDS (9.7-12.2)   03/01/19  11:44    


 


INR  1.0   03/01/19  11:44    


 


APTT  56 SECONDS (21-34)  H  03/01/19  11:44    














- Head Exam


Head Exam: ATRAUMATIC, NORMAL INSPECTION





- Eye Exam


Eye Exam: EOMI





- ENT Exam


ENT Exam: Mucous Membranes Moist





- Respiratory Exam


Respiratory Exam: Clear to Ausculation Bilateral, NORMAL BREATHING PATTERN





- Cardiovascular Exam


Cardiovascular Exam: REGULAR RHYTHM, +S1, +S2





- GI/Abdominal Exam


GI & Abdominal Exam: Normal Bowel Sounds





- Extremities Exam


Extremities Exam: Full ROM, Normal Capillary Refill, Normal Inspection





- Neurological Exam


Neurological Exam: Alert, Awake, Oriented x3





Assessment and Plan





- Assessment and Plan (Free Text)


Assessment: 


CAD/Myocardial infarction


-continue medications as per cardiology, dual antiplatelet, statin and ACEI


-patient able to take oral medications, denies any chest pain


-patient has no IV medications.


-Patient remains hemodynamically stable

## 2019-03-03 VITALS — SYSTOLIC BLOOD PRESSURE: 100 MMHG | DIASTOLIC BLOOD PRESSURE: 66 MMHG

## 2019-03-03 VITALS — TEMPERATURE: 97.9 F

## 2019-03-03 VITALS — HEART RATE: 70 BPM

## 2019-03-03 LAB
ALBUMIN SERPL-MCNC: 4.1 G/DL (ref 3.5–5)
ALBUMIN/GLOB SERPL: 1.3 {RATIO} (ref 1–2.1)
ALT SERPL-CCNC: 105 U/L (ref 21–72)
AST SERPL-CCNC: 277 U/L (ref 17–59)
BASOPHILS # BLD AUTO: 0 K/UL (ref 0–0.2)
BASOPHILS NFR BLD: 0.3 % (ref 0–2)
BUN SERPL-MCNC: 20 MG/DL (ref 9–20)
CALCIUM SERPL-MCNC: 8.9 MG/DL (ref 8.6–10.4)
EOSINOPHIL # BLD AUTO: 0.1 K/UL (ref 0–0.7)
EOSINOPHIL NFR BLD: 0.8 % (ref 0–4)
ERYTHROCYTE [DISTWIDTH] IN BLOOD BY AUTOMATED COUNT: 13.9 % (ref 11.5–14.5)
GFR NON-AFRICAN AMERICAN: > 60
HGB BLD-MCNC: 15.1 G/DL (ref 12–18)
LYMPHOCYTES # BLD AUTO: 2.2 K/UL (ref 1–4.3)
LYMPHOCYTES NFR BLD AUTO: 22.5 % (ref 20–40)
MCH RBC QN AUTO: 33.9 PG (ref 27–31)
MCHC RBC AUTO-ENTMCNC: 33.6 G/DL (ref 33–37)
MCV RBC AUTO: 100.7 FL (ref 80–94)
MONOCYTES # BLD: 0.9 K/UL (ref 0–0.8)
MONOCYTES NFR BLD: 8.7 % (ref 0–10)
NEUTROPHILS # BLD: 6.6 K/UL (ref 1.8–7)
NEUTROPHILS NFR BLD AUTO: 67.7 % (ref 50–75)
NRBC BLD AUTO-RTO: 0.1 % (ref 0–2)
PLATELET # BLD: 264 K/UL (ref 130–400)
PMV BLD AUTO: 9.6 FL (ref 7.2–11.7)
RBC # BLD AUTO: 4.45 MIL/UL (ref 4.4–5.9)
WBC # BLD AUTO: 9.8 K/UL (ref 4.8–10.8)

## 2019-03-03 RX ADMIN — METOPROLOL SUCCINATE SCH: 25 TABLET, EXTENDED RELEASE ORAL at 09:01

## 2019-03-03 NOTE — CARD
--------------- APPROVED REPORT --------------





Date of service: 03/01/2019



EXAM: Two-dimensional and M-mode echocardiogram with Doppler and 

color Doppler.



Other Information 

Quality : Good[employer] : 



INDICATION

Abnormal EKG/Arrhythmia Chest Pain Status/Post MI 



Surgery/Intervention





2D DIMENSIONS 

IVSd1.4   (0.7-1.1cm)LVDd4.6   (3.9-5.9cm)

PWd1.1   (0.7-1.1cm)LA [id number]   (18-58mL)

LVDs3.6   (2.5-4.0cm)FS (%) 20.9   %

LVEF (%)50.0   (>50%)LVEF (Collazo's)49.87 %



M-Mode DIMENSIONS 

Left Atrium (MM)3.77   (2.5-4.0cm)IVSd1.28   (0.7-1.1cm)

Aortic Root3.49   (2.2-3.7cm)LVDd5.12   (4.0-5.6cm)

Aortic Cusp Exc.2.09   (1.5-2.0cm)PWd1.21   (0.7-1.1cm)

FS (%) 29   %LVDs3.65   (2.0-3.8cm)

LVEF (%)50   (>50%)



Mitral Valve

MV E Nsvagxjf35.5cm/sMV A Ttuzlzbw345.9cm/sE/A ratio0.6



TDI

Lateral E' Peak V6.29cm/sMedial E' Peak V3.71cm/sE/Lateral E'10.7

E/Medial E'18.2



Tricuspid Valve

TR Peak [id number]/sTR Peak Gr.[id number]



 LEFT VENTRICLE 

The left ventricle is normal size.

There is mild concentric left ventricular hypertrophy.

The left ventricular function is normal.

The left ventricular ejection fraction is within the normal range.

There is normal LV segmental wall motion.

Transmitral Doppler flow pattern is abnormal.



 RIGHT VENTRICLE 

The right ventricle is normal size.



 ATRIA 

The left atrium size is normal.

The right atrium size is normal.



 AORTIC VALVE 

The aortic valve is normal in structure.



 MITRAL VALVE 

Mitral regurgitation is trace.



 TRICUSPID VALVE 

There is mild tricuspid regurgitation.



<Conclusion>

Normal LV systolic function.

Diastolic dysufunction.

Normal chamber size.

Trace MR.

Mild TR

## 2019-03-03 NOTE — CP.PCM.DIS
<Nilam Streeter - Last Filed: 19 19:32>





Provider





- Provider


Date of Admission: 


19 11:52





Attending physician: 


Anat Houser DO





Consults: 








19 11:12


Cardiology Consult Stat 


   Comment: 


   Consulting Provider: Aziza Horton


   Consulting Physician: Aziza Horton


   Reason for Consult: code heart





19 16:42


Case Management Referral Routine 


   Comment: 


   Physician Instructions: 


   Reason For Exam: 


   Reason for Referral: Discharge Planning











Time Spent in preparation of Discharge (in minutes): 40





Hospital Course





- Lab Results


Lab Results: 


                                  Micro Results





19 14:15   Naris   MRSA Culture (Admit) - Final


                            MRSA NOT DETECTED





                             Most Recent Lab Values











WBC  9.8 K/uL (4.8-10.8)   19  09:05    


 


RBC  4.45 Mil/uL (4.40-5.90)   19  09:05    


 


Hgb  15.1 g/dL (12.0-18.0)   19  09:05    


 


Hct  44.8 % (35.0-51.0)   19  09:05    


 


MCV  100.7 fL (80.0-94.0)  H  19  09:05    


 


MCH  33.9 pg (27.0-31.0)  H  19  09:05    


 


MCHC  33.6 g/dL (33.0-37.0)   19  09:05    


 


RDW  13.9 % (11.5-14.5)   19  09:05    


 


Plt Count  264 K/uL (130-400)   19  09:05    


 


MPV  9.6 fL (7.2-11.7)   19  09:05    


 


Neut % (Auto)  67.7 % (50.0-75.0)   19  09:05    


 


Lymph % (Auto)  22.5 % (20.0-40.0)   19  09:05    


 


Mono % (Auto)  8.7 % (0.0-10.0)   19  09:05    


 


Eos % (Auto)  0.8 % (0.0-4.0)   19  09:05    


 


Baso % (Auto)  0.3 % (0.0-2.0)   19  09:05    


 


Neut # (Auto)  6.6 K/uL (1.8-7.0)   19  09:05    


 


Lymph # (Auto)  2.2 K/uL (1.0-4.3)   19  09:05    


 


Mono # (Auto)  0.9 K/uL (0.0-0.8)  H  19  09:05    


 


Eos # (Auto)  0.1 K/uL (0.0-0.7)   19  09:05    


 


Baso # (Auto)  0.0 K/uL (0.0-0.2)   19  09:05    


 


Neutrophils % (Manual)  93 % (50-75)  H  19  11:44    


 


Lymphocytes % (Manual)  4 % (20-40)  L  19  11:44    


 


Monocytes % (Manual)  3 % (0-10)   19  11:44    


 


Platelet Estimate  Normal  (NORMAL)   19  11:44    


 


RBC Morphology  Normal   19  11:44    


 


PT  11.2 SECONDS (9.7-12.2)   19  11:44    


 


INR  1.0   19  11:44    


 


APTT  56 SECONDS (21-34)  H  19  11:44    


 


Sodium  137 mmol/L (132-148)   19  09:05    


 


Potassium  3.9 mmol/L (3.6-5.2)   19  09:05    


 


Chloride  105 mmol/L ()   19  09:05    


 


Carbon Dioxide  26 mmol/L (22-30)   19  09:05    


 


Anion Gap  11  (10-20)   19  09:05    


 


BUN  20 mg/dL (9-20)   19  09:05    


 


Creatinine  0.8 mg/dL (0.8-1.5)   19  09:05    


 


Est GFR ( Amer)  > 60   19  09:05    


 


Est GFR (Non-Af Amer)  > 60   19  09:05    


 


Random Glucose  105 mg/dL ()  D 19  09:05    


 


Hemoglobin A1c  6.0 % (4.2-6.5)   19  05:50    


 


Calcium  8.9 mg/dl (8.6-10.4)   19  09:05    


 


Phosphorus  2.7 mg/dL (2.5-4.5)   19  09:05    


 


Magnesium  2.0 mg/dL (1.6-2.3)   19  09:05    


 


Total Bilirubin  1.6 mg/dL (0.2-1.3)  H  19  09:05    


 


AST  277 U/L (17-59)  H D 19  09:05    


 


ALT  105 U/L (21-72)  H D 19  09:05    


 


Alkaline Phosphatase  85 U/L ()   19  09:05    


 


Lactate Dehydrogenase  645 U/L (313-618)  H  19  11:44    


 


Total Creatine Kinase  5849 U/L ()  H  19  06:00    


 


CK-MB (Mass)  213 ng/mL (0.0-3.38)  H  19  06:00    


 


Troponin I  164.0000 ng/mL (0.00-0.120)  H*  19  06:00    


 


Total Protein  7.3 g/dL (6.3-8.3)   19  09:05    


 


Albumin  4.1 g/dL (3.5-5.0)   19  09:05    


 


Globulin  3.2 gm/dL (2.2-3.9)   19  09:05    


 


Albumin/Globulin Ratio  1.3  (1.0-2.1)   19  09:05    


 


Triglycerides  124 mg/dL (0-149)   19  05:50    


 


Cholesterol  163 mg/dL (0-199)   19  05:50    


 


LDL Cholesterol Direct  111 mg/dL (0-129)   19  05:50    


 


HDL Cholesterol  38 mg/dL (30-70)   19  05:50    


 


Free T4  0.80 ng/dL (0.78-2.19)   19  05:50    


 


TSH 3rd Generation  0.20 mIU/L (0.46-4.68)  L  19  05:50    


 


Hepatitis A IgM Ab  Negative  (NEGATIVE)   19  08:16    


 


Hep Bs Antigen  Negative  (NEGATIVE)   19  08:16    


 


Hep B Core IgM Ab  Negative  (NEGATIVE)   19  08:16    


 


Hepatitis C Antibody  Negative  (NEGATIVE)   19  08:16    


 


Blood Type  O POSITIVE   19  11:52    


 


Antibody Screen  Negative   19  11:52    














- Hospital Course


Hospital Course: 





This is a 64 year old male, originally from [place], with past medical hx of

personal tobacco abuse, also significant secondhand smoke exposure, presenting 

today with chief complaint of chest pain. Pt brought in by EMS. Patient is 

Nepali speaking only. Hx obtained through step daughter. Pt says that as 

patient was getting ready for work this morning around 7:30 am, began feeling 

some chest tightness and pressure and overall not feeling right. Reports this 

has never happened before. Reports pain was diffuse across chest with no 

radiation to jaw or arm. Pain was constant with severity of 9/10. Patient did 

not take any medications at home to alleviate pain. Initially patient was going 

to ride it out but began shaking and feeling pale, decision was made to call 

ambulance. 





PMD: None; reports he has not seen a doctor in an outpatient setting in many 

years.


PMH: Tobacco abuse, secondhand smoke exposure (wife)


PSH: Denies


Allergies: NKDA


FH: Mother- 


Father-


Reports family hx of DM. Otherwise denies hx of heart dx, cancer, etc.


Current medications: None


Social hx: Current smoker. Cutting down now but but pack per day for over 20 

yrs. Social drinker. No drug use. Born in [place]. 7 children. Lives with wife and 2 

stepdaughters. . 


Prior visits: Recent ER visit 2018 for lower back pain. No prior admissions. 


Code status: Stepdaughter reports this has not been discussed yet.


Advance directive: None


Healthcare proxy:  None. 





Hospital Course:


Patient was brought to the ER for chest pain and was found to have a STEMI. 

Cardiology, Dr. Horton was notified and patient was taken to the cath lab and  

received a drug-eluting stent to the RCA.  Patient admitted to the ICU after 

procedure.  Patient was started on various medications are noted below.  Patient

was transferred to telemetry floor on 3/2/19. Echocardiogram was performed which

showed an EF 50% and normal systolic function. After speaking with cardiology 

patient was stable for discharge home with Plavix and Aspirin instead of 

Brillinta as patient does not have insurance at this time.  Encouraged patient 

extensively by various providers to stop smoking as it is a risk factor for 

heart disease.  





Discussed with patient to please continue medications:


Lisinopril 2.5mg one tablet daily


Losartan 25mg one tablet daily


Metoprolol Succinate 12.5mg one tablet daily


Aspirin 81mg one tablet daily


Plavix 75mg one tablet daily


Crestor 20mg one tablet in the evening


Patient to follow up with cardiologist Dr. Horton one week after discharge.


Patient follow up with the [facility]:


[place] [address]


#[phone]








This is a summary of the patient's hospital course.  Please refer to EMR for 

complete details. 





Assessment and Plan


(1) STEMI (ST elevation myocardial infarction)


Assessment & Plan: 


Cardiology on consult help appreciated


Echocardiogram pending


Aspirin 81 mg 1 tab p.o. daily


Brilinta 90 mg once a day once twice a day will need to discuss with cardiology 

if appropriate to switch to Plavix given patient does not have insurance


Crestor on hold given transaminitis


Losartan 25 once a day


Start Toprol-XL 12.5 mg once a day


Lipid panel reviewed noted below 80 HDL


TSH is low but free T4 is normal


Pending official cardiac cath report however patient did receive a drug-eluting 

stent to the RCA.


A1c pending


Status: Acute   





(2) Tobacco abuse


Assessment & Plan: 


Patient counseled extensively at bedside in regards to smoking is a risk factor 

for heart disease he is aware he should stop smoking


Nicotine patch daily


Status: Acute   





(3) Transaminitis


Assessment & Plan: 


Patient had an acute rise in LFTs


Discontinue Tylenol and


Held Crestor


Hepatitis panel we will continue to trend LFTs unclear which relieved as an 

acute phase reactant or hyperperfusion delivered in light of a heart attack


Status: Acute   





(4) Prophylactic measure


Assessment & Plan: 


DVT prophylaxis heparin 5000 subcu 8


Heart healthy diet


Smoking cessation provided





Discharge Exam





- Head Exam


Head Exam: ATRAUMATIC, NORMAL INSPECTION





- Eye Exam


Eye Exam: EOMI, Normal appearance





- ENT Exam


ENT Exam: Mucous Membranes Moist





- Respiratory Exam


Respiratory Exam: Clear to PA & Lateral, NORMAL BREATHING PATTERN





- Cardiovascular Exam


Cardiovascular Exam: REGULAR RHYTHM, +S1, +S2





- GI/Abdominal Exam


GI & Abdominal Exam: Normal Bowel Sounds, Soft.  absent: Tenderness





- Extremities Exam


Extremities exam: normal inspection





- Neurological Exam


Neurological exam: Alert, Oriented x3





- Psychiatric Exam


Psychiatric exam: Normal Affect





- Skin


Skin Exam: Normal Color





Discharge Plan





- Discharge Medications


Prescriptions: 


Aspirin [Ecotrin] 81 mg PO DAILY #30 tabec


Clopidogrel Bisulfate [Plavix] 75 mg PO DAILY #30 tablet


Losartan [Cozaar] 25 mg PO DAILY #30 tab


Metoprolol Succinate XL [Toprol XL] 12.5 mg PO DAILY #30 tab


Rosuvastatin Calcium [Crestor] 20 mg PO HS #30 tab





- Follow Up Plan


Condition: FAIR


Disposition: HOME/ ROUTINE


Instructions:  Quitting Smoking for Older Adults, Aspirin to Prevent Heart Att

acks, Cancer, and Death


Additional Instructions: 


Please continue medications:


Lisinopril 2.5mg one tablet daily


Losartan 25mg one tablet daily


Metoprolol Succinate 12.5mg one tablet daily


Aspirin 81mg one tablet daily


Plavix 75mg one tablet daily


Crestor 20mg one tablet in the evening


Please follow up with cardiologist Dr. Horton one week after discharge.


Please follow up with the [facility]:


[place] [address]


#[phone]








Por favor contine con los medicamentos:


Lisinopril 2,5 mg skyler tableta al da


Losartan 25 mg skyler tableta diaria


Metoprolol Succinate 12.5mg skyler tableta diaria


Aspirina 81 mg skyler tableta diaria


Plavix 75 mg skyler tableta diaria


Crestor 20 mg skyler tableta en la noche


Por favor camelia un seguimiento con el cardilogo Dr. Horton skyler semana despus del

 ethan.


Por favor camelia un seguimiento con la [facility]:


[place] [address]


#[phone]





Referrals: 


Aziza Horton MD [Staff Provider] - 


Fauzia Valdes MD [Staff Provider] - 





<Anat Houser - Last Filed: 19 22:20>





Provider





- Provider


Date of Admission: 


19 11:52





Attending physician: 


Anat Houser DO





Consults: 








19 11:12


Cardiology Consult Stat 


   Comment: 


   Consulting Provider: Aziza Horton


   Consulting Physician: Aziza Horton


   Reason for Consult: code heart





19 16:42


Case Management Referral Routine 


   Comment: 


   Physician Instructions: 


   Reason For Exam: 


   Reason for Referral: Discharge Planning














Diagnosis





- Discharge Diagnosis


(1) STEMI (ST elevation myocardial infarction)


Status: Acute   





(2) Tobacco abuse


Status: Acute   





(3) Transaminitis


Status: Acute   





(4) Prophylactic measure


Status: Acute   





Hospital Course





- Lab Results


Lab Results: 


                                  Micro Results





19 17:45   Naris   MRSA Culture - Final


                            MRSA NOT DETECTED


19 14:15   Naris   MRSA Culture (Admit) - Final


                            MRSA NOT DETECTED





                             Most Recent Lab Values











WBC  9.8 K/uL (4.8-10.8)   19  09:05    


 


RBC  4.45 Mil/uL (4.40-5.90)   19  09:05    


 


Hgb  15.1 g/dL (12.0-18.0)   19  09:05    


 


Hct  44.8 % (35.0-51.0)   19  09:05    


 


MCV  100.7 fL (80.0-94.0)  H  19  09:05    


 


MCH  33.9 pg (27.0-31.0)  H  19  09:05    


 


MCHC  33.6 g/dL (33.0-37.0)   19  09:05    


 


RDW  13.9 % (11.5-14.5)   19  09:05    


 


Plt Count  264 K/uL (130-400)   19  09:05    


 


MPV  9.6 fL (7.2-11.7)   19  09:05    


 


Neut % (Auto)  67.7 % (50.0-75.0)   19  09:05    


 


Lymph % (Auto)  22.5 % (20.0-40.0)   19  09:05    


 


Mono % (Auto)  8.7 % (0.0-10.0)   19  09:05    


 


Eos % (Auto)  0.8 % (0.0-4.0)   19  09:05    


 


Baso % (Auto)  0.3 % (0.0-2.0)   19  09:05    


 


Neut # (Auto)  6.6 K/uL (1.8-7.0)   19  09:05    


 


Lymph # (Auto)  2.2 K/uL (1.0-4.3)   19  09:05    


 


Mono # (Auto)  0.9 K/uL (0.0-0.8)  H  19  09:05    


 


Eos # (Auto)  0.1 K/uL (0.0-0.7)   19  09:05    


 


Baso # (Auto)  0.0 K/uL (0.0-0.2)   19  09:05    


 


Neutrophils % (Manual)  93 % (50-75)  H  19  11:44    


 


Lymphocytes % (Manual)  4 % (20-40)  L  19  11:44    


 


Monocytes % (Manual)  3 % (0-10)   19  11:44    


 


Platelet Estimate  Normal  (NORMAL)   19  11:44    


 


RBC Morphology  Normal   19  11:44    


 


PT  11.2 SECONDS (9.7-12.2)   19  11:44    


 


INR  1.0   19  11:44    


 


APTT  56 SECONDS (21-34)  H  19  11:44    


 


Sodium  137 mmol/L (132-148)   19  09:05    


 


Potassium  3.9 mmol/L (3.6-5.2)   19  09:05    


 


Chloride  105 mmol/L ()   19  09:05    


 


Carbon Dioxide  26 mmol/L (22-30)   19  09:05    


 


Anion Gap  11  (10-20)   19  09:05    


 


BUN  20 mg/dL (9-20)   19  09:05    


 


Creatinine  0.8 mg/dL (0.8-1.5)   19  09:05    


 


Est GFR ( Amer)  > 60   19  09:05    


 


Est GFR (Non-Af Amer)  > 60   19  09:05    


 


Random Glucose  105 mg/dL ()  D 19  09:05    


 


Hemoglobin A1c  6.0 % (4.2-6.5)   19  05:50    


 


Calcium  8.9 mg/dl (8.6-10.4)   19  09:05    


 


Phosphorus  2.7 mg/dL (2.5-4.5)   19  09:05    


 


Magnesium  2.0 mg/dL (1.6-2.3)   19  09:05    


 


Total Bilirubin  1.6 mg/dL (0.2-1.3)  H  19  09:05    


 


AST  277 U/L (17-59)  H D 19  09:05    


 


ALT  105 U/L (21-72)  H D 19  09:05    


 


Alkaline Phosphatase  85 U/L ()   19  09:05    


 


Lactate Dehydrogenase  645 U/L (313-618)  H  19  11:44    


 


Total Creatine Kinase  5849 U/L ()  H  19  06:00    


 


CK-MB (Mass)  213 ng/mL (0.0-3.38)  H  19  06:00    


 


Troponin I  164.0000 ng/mL (0.00-0.120)  H*  19  06:00    


 


Total Protein  7.3 g/dL (6.3-8.3)   19  09:05    


 


Albumin  4.1 g/dL (3.5-5.0)   19  09:05    


 


Globulin  3.2 gm/dL (2.2-3.9)   19  09:05    


 


Albumin/Globulin Ratio  1.3  (1.0-2.1)   19  09:05    


 


Triglycerides  124 mg/dL (0-149)   19  05:50    


 


Cholesterol  163 mg/dL (0-199)   19  05:50    


 


LDL Cholesterol Direct  111 mg/dL (0-129)   19  05:50    


 


HDL Cholesterol  38 mg/dL (30-70)   19  05:50    


 


Free T4  0.80 ng/dL (0.78-2.19)   19  05:50    


 


TSH 3rd Generation  0.20 mIU/L (0.46-4.68)  L  19  05:50    


 


Hepatitis A IgM Ab  Negative  (NEGATIVE)   19  08:16    


 


Hep Bs Antigen  Negative  (NEGATIVE)   19  08:16    


 


Hep B Core IgM Ab  Negative  (NEGATIVE)   19  08:16    


 


Hepatitis C Antibody  Negative  (NEGATIVE)   19  08:16    


 


Blood Type  O POSITIVE   19  11:52    


 


Antibody Screen  Negative   19  11:52    














Attending/Attestation





- Attestation


I have personally seen and examined this patient.: Yes


I have fully participated in the care of the patient.: Yes


I have reviewed all pertinent clinical information, including history, physical 

exam and plan: Yes


Notes (Text): 


Patient seen, examined and case discussed with day-time resident.


Patient seen this morning. patient denies acute complaints.


Patient was transferred out from the ICU yesterday. He is aware he needs to stop

smoking. He is aware he needs to take five medications daily for his heart. He 

is also aware he has any shortness of breathe, chest pain, palpitations, fatigue

to come back immediately for evaluation.


Resident has spoken with cardiology, stable from cardiology standpoint for 

discharge.





medications on discharge:


1) aspirin 81mg Po daily


2) plavix 75mg PO daily


3) toprol XL 12.5mg PO daily


4) Losartan 25mg PO daily


5) crestor 20mgPOqhs


Will call in AM to d/c lisinopril which is in error.





Patient advised to follow-up and establish care at the [facility]; will need cardiology referral for follow-up. Will need repeat LFTS on 

discharge.





This is a brief summary of patient's hospitalization. Please see EMR for full 

record and detail.





Assessment/Plan


(1) STEMI (ST elevation myocardial infarction) s/p PAIGE RCA


       Coronary Artery Disease


Assessment & Plan: 


* Cardiology on consult help appreciated


* Echocardiogram official report noted;


* Aspirin 81 mg 1 tab p.o. daily


* Brilinta 90 mg once a day once twice a day switch to Plavix 75mg PO daily 

  given patient's lack of insurance


* Losartan 25 once a da


* Toprol-XL 12.5 mg once a da


* Lipid panel reviewed noted below 80 HD


* TSH is low but free T4 is normal


* ficx6sI 6.0


* Cardiac cath report however patient did receive a drug-eluting stent to the 

  RCA.


Status: Acute   





(2) Tobacco abuse


Assessment & Plan: 


* Patient counseled extensively at bedside in regards to smoking is a risk 

  factor for heart disease he is aware he should stop smoking


* Nicotine patch daily


Status: Acute   





(3) Transaminitis


Assessment & Plan: 


* Patient had an acute rise in LFTs initially


* Discontinue Tylenol 


* Held Crestor


* Hepatitis panel: negative we will continue to trend LFTs unclear which 

  relieved as an acute phase reactant or hyperperfusion delivered in light of a 

  heart attack


Status: Acute   





(4) Prophylactic measure


Assessment & Plan: 


* DVT prophylaxis heparin 5000 subcu 8


* Heart healthy diet


* Smoking cessation provided

## 2019-03-04 NOTE — CARD
--------------- APPROVED REPORT --------------





Date of service: 03/01/2019



EKG Measurement

Heart [id number]

PA [id number]

[id number]

[id number]

[id number]



<Conclusion>

Sinus rhythm with premature atrial complexes

Left axis deviation

Inferior infarct, age undetermined

Abnormal ECG

## 2019-03-05 NOTE — CATH
--------------- APPROVED REPORT --------------





Date of service: 03/01/2019



Procedure(s) performed: Cardiac catheterization and Angioplasty ans 

stenting of RCA.



HISTORY

: The patient is a 64 year-old male with a history of .



INDICATION

The indication(s) include : STEMI (>0 to less than or equal to 6 

hours).



CASE TECHNIQUE

The patient was brought urgently to the Cardiac Catheterization 

Laboratory in a fasting state and was prepped and draped in a sterile 

manner. The right femoral groin was infiltrated with 2% Lidocaine 

subcutaneous anesthesia. A 6 F sheath was inserted into the right 

femoral artery without difficulty. Coronary angiography was performed 

using coronary diagnostic catheters. The left coronary system was 

accessed and visualized with a 6 F JR 4 catheter. The right coronary 

system was accessed and visualized with a Guided catheter. 

Pre-demployment femoral angiogram was performed . Closure device was 

deployed with a 6 Fr Angioseal without any complications.



Vessel Analysis

The patient's coronary anatomy is right dominant.



The left main coronary artery is a medium size vessel with intimal 

irregularities. 



The left anterior descending artery is a medium size vessel . There 

is a 75% stenosis in the mid segment. The first diagonal branch is a 

medium size vessel . There is a 75% stenosis in the proximal segment. 



The circumflex artery is a medium size vessel . There is a 55% 

stenosis in the distal segment. The first obtuse marginal branch is a 

medium size vessel . There is a 80% stenosis in the ostial segment. 



The right coronary artery is a large size vessel . There is a 100% 

stenosis in the proximal segment. 



PCI Technique Lesion

Anticoagulation was achieved with Heparin. Percutaneous coronary 

intervention was performed on the proximal right coronary artery. The 

lesion stenosis prior to intervention was 100% with ASHLEE 0 flow. A 6 

F Guide Catheter was used to engage the RCA ostium.



BALLOON DILATION

A Balloon catheter 2.5X12 was inserted and inflated up to 10atm for 

30seconds. Repeat angiography revealed the following post-dilatation 

results: 90% in the proximal.



STENT DEPLOYMENT

A drug-eluting stent 3.5X18 was inserted and inflated up to 12atm for 

30seconds.



Final angiography reveals 0 % stenosis with ASHLEE 3 flow.



Conclusion

Multi-vessel disease.

Successful PTCA/Stent of the RCA.





Recommendations

Smoking Cessation

DAPT compliance.

Risk factor modification.

## 2019-03-06 NOTE — CARD
--------------- APPROVED REPORT --------------





Date of service: 03/01/2019



EKG Measurement

Heart [id number]

[id number]

[id number]

[id number]

[id number]



<Conclusion>

Sinus rhythm with sinus arrhythmia with 1st degree AV block

Inferior infarct, possibly acute

Anterolateral injury pattern

** ** ACUTE MI / STEMI ** **

Consider right ventricular involvement in acute inferior infarct

Abnormal ECG